# Patient Record
Sex: MALE | Race: WHITE | NOT HISPANIC OR LATINO | Employment: OTHER | URBAN - METROPOLITAN AREA
[De-identification: names, ages, dates, MRNs, and addresses within clinical notes are randomized per-mention and may not be internally consistent; named-entity substitution may affect disease eponyms.]

---

## 2017-03-13 ENCOUNTER — HISTORICAL (OUTPATIENT)
Dept: LAB | Facility: HOSPITAL | Age: 52
End: 2017-03-13

## 2018-06-27 LAB
ABS NEUT (OLG): 6.12 X10(3)/MCL (ref 2.1–9.2)
APTT PPP: 32.6 SECOND(S) (ref 24.8–36.9)
BASOPHILS # BLD AUTO: 0 X10(3)/MCL (ref 0–0.2)
BASOPHILS NFR BLD AUTO: 0 %
BUN SERPL-MCNC: 24 MG/DL (ref 7–18)
CALCIUM SERPL-MCNC: 9.1 MG/DL (ref 8.5–10.1)
CHLORIDE SERPL-SCNC: 99 MMOL/L (ref 98–107)
CO2 SERPL-SCNC: 26 MMOL/L (ref 21–32)
CREAT SERPL-MCNC: 1.51 MG/DL (ref 0.7–1.3)
CREAT/UREA NIT SERPL: 15.9
EOSINOPHIL # BLD AUTO: 0.2 X10(3)/MCL (ref 0–0.9)
EOSINOPHIL NFR BLD AUTO: 2 %
ERYTHROCYTE [DISTWIDTH] IN BLOOD BY AUTOMATED COUNT: 15.3 % (ref 11.5–17)
GLUCOSE SERPL-MCNC: 373 MG/DL (ref 74–106)
HCT VFR BLD AUTO: 41.7 % (ref 42–52)
HGB BLD-MCNC: 13.6 GM/DL (ref 14–18)
INR PPP: 1.06 (ref 0–1.27)
LYMPHOCYTES # BLD AUTO: 1.3 X10(3)/MCL (ref 0.6–4.6)
LYMPHOCYTES NFR BLD AUTO: 16 %
MCH RBC QN AUTO: 27.5 PG (ref 27–31)
MCHC RBC AUTO-ENTMCNC: 32.6 GM/DL (ref 33–36)
MCV RBC AUTO: 84.2 FL (ref 80–94)
MONOCYTES # BLD AUTO: 0.6 X10(3)/MCL (ref 0.1–1.3)
MONOCYTES NFR BLD AUTO: 7 %
NEUTROPHILS # BLD AUTO: 6.12 X10(3)/MCL (ref 1.4–7.9)
NEUTROPHILS NFR BLD AUTO: 74 %
PLATELET # BLD AUTO: 205 X10(3)/MCL (ref 130–400)
PMV BLD AUTO: 9.8 FL (ref 9.4–12.4)
POTASSIUM SERPL-SCNC: 4.3 MMOL/L (ref 3.5–5.1)
PROTHROMBIN TIME: 14.1 SECOND(S) (ref 12.2–14.7)
RBC # BLD AUTO: 4.95 X10(6)/MCL (ref 4.7–6.1)
SODIUM SERPL-SCNC: 137 MMOL/L (ref 136–145)
WBC # SPEC AUTO: 8.3 X10(3)/MCL (ref 4.5–11.5)

## 2018-06-29 ENCOUNTER — HISTORICAL (OUTPATIENT)
Dept: ADMINISTRATIVE | Facility: HOSPITAL | Age: 53
End: 2018-06-29

## 2019-08-18 PROBLEM — R55 SYNCOPE: Status: ACTIVE | Noted: 2019-08-18

## 2019-08-18 PROBLEM — E86.0 DEHYDRATION: Status: ACTIVE | Noted: 2019-08-18

## 2019-08-18 PROBLEM — I20.9 ANGINA PECTORIS: Status: ACTIVE | Noted: 2019-08-18

## 2019-08-19 PROBLEM — E11.9 DM TYPE 2 (DIABETES MELLITUS, TYPE 2): Status: ACTIVE | Noted: 2019-08-19

## 2019-08-19 PROBLEM — N17.9 AKI (ACUTE KIDNEY INJURY): Status: ACTIVE | Noted: 2019-08-19

## 2019-08-19 PROBLEM — E87.6 HYPOKALEMIA: Status: ACTIVE | Noted: 2019-08-19

## 2019-08-20 PROBLEM — E83.9 CHRONIC KIDNEY DISEASE-MINERAL AND BONE DISORDER: Status: ACTIVE | Noted: 2019-08-20

## 2019-08-20 PROBLEM — M89.9 CHRONIC KIDNEY DISEASE-MINERAL AND BONE DISORDER: Status: ACTIVE | Noted: 2019-08-20

## 2019-08-20 PROBLEM — E11.21 DIABETIC KIDNEY DISEASE: Status: ACTIVE | Noted: 2019-08-20

## 2019-08-20 PROBLEM — I10 ESSENTIAL HYPERTENSION: Status: ACTIVE | Noted: 2019-08-20

## 2019-08-20 PROBLEM — N18.9 CHRONIC KIDNEY DISEASE-MINERAL AND BONE DISORDER: Status: ACTIVE | Noted: 2019-08-20

## 2022-04-30 NOTE — OP NOTE
Patient:   Magdiel Patino             MRN: [id number]            FIN: [id number]               Age:   53 years     Sex:  Male     :  1965   Associated Diagnoses:   None   Author:   Dash Lovett DPM      Operative Note   Operative Information   Date/ Time:  2018 14:03:00.     Procedures Performed: Delayed primary closure right foot.     Preoperative Diagnosis: Open surgical wound right foot.     Postoperative Diagnosis: Same.     Surgeon: Dash Lovett DPM.     Anesthesia: Local with sedation.     Speciman Removed: None.     Description of Procedure/Findings/    Complications: Procedure #1: Delayed primary closure right foot.    Attention was directed to the right foot where the existing dehisced amputation site was identified.  Utilizing a 15 blade scalpel bone rongeur and a curette all necrotic tissue was removed from the area.  Once adequate removal of necrotic tissue was performed, the edges were re-freshened.  A area was then thoroughly irrigated.  PRP was applied to the area.  The area was then closed via layered closure..     Esimated blood loss: No blood loss.